# Patient Record
(demographics unavailable — no encounter records)

---

## 2025-03-18 NOTE — ADDENDUM
[FreeTextEntry1] : Na LAND assisted in documentation on 03/18/2025   acting as a scribe for Dr. Pedro Jewell.

## 2025-03-18 NOTE — HISTORY OF PRESENT ILLNESS
[FreeTextEntry1] : Patient with history of leukemia, AF s/p AF ablation 2013 CHADVSC 2 on amiodarone started few years a ago. Pt had recurrence of AF RVR least week. Pt felt little palpitations.   Patient is feeling well, no palpitations.  Patient is feeling fine. Currently in normal sinus rhythm.  Patient has recently been diagnosed with hypothyroidism. His TSH is elevated to 7.9. However, 3T3 and 3T4 are normal. Primary care recommended to monitor.   Patient comes to the office today for routine follow-up. Patient feels great and continues to use his CPAP machine for treatment of his PORFIRIO. No bleeding.    09/20/2024: Patient comes to the office today for follow-up. Patient is doing great, recently came back from [place]. Patient did not have any events and there was no recurrence of atrial fibrillation. No bleeding.   03/18/2025: Patient comes to the office today for routine follow-up, Unfortunately, he was recently diagnosed with recurrence of CLL and was started on chemotherapy. Patient's platelets and hemoglobin have been checked. Patient denies any bleeding on Eliquis.      EKG (03/18/2025): Sinus rhythm at 65 bpm EKG (09/20/2024): Sinus rhythm at 66 bpm.  EKG (12/22/2023): Sinus rhythm at 56 bpm, first degree AV block.    EKG (03/24/2023): Sinus rhythm at 62 bpm, first degree AV block,  ms. EKG (01/13/2023) Sinus bradycardia at 47 bpm. EKG (12/02/2022) Sinus rhythm 57 bpm Event monitor (01/12/2023): Average heart rate of 64 bpm, no atrial fibrillation.   creatine 1.2 H/H13/39 plt 103 echo 3/22 - EF normal, mod LA dil Vol 36  Holter Monitor (05/2022) 23% AF burden  LABS 11/11/23: glucose 96, BUN 18, sodium 144, creat 1.13, K+ 4.1, AST 18, ALT 22, hgb 13.4, hct 40.6, platelet 101

## 2025-03-18 NOTE — END OF VISIT
[Time Spent: ___ minutes] : I have spent [unfilled] minutes of time on the encounter which excludes teaching and separately reported services. [FreeTextEntry3] :  All medical record entries made by my scribe were at my, Dr. Pedro Jewell, direction and personally dictated by me. I have reviewed the chart and agree that the record accurately reflects my personal performance of the history, physical exam, assessment and plan. I have also personally directed, reviewed, and agreed with the chart.

## 2025-03-18 NOTE — ASSESSMENT
[FreeTextEntry1] : Atrial Fibrillation CHADVASC 2 - Currently, patient is doing well. Will continue to monitor.   - Continue Eliquis 2.5 mg Q12 (age, weight).     - Continue Toprol 25 mg QD.    - labs from [facility] reviewed  plt 80 Hb 9   Bradycardia - Patient's bradycardia has significantly improved since stopping Amiodarone.  - Continue to monitor.